# Patient Record
Sex: FEMALE | Race: WHITE | NOT HISPANIC OR LATINO | URBAN - METROPOLITAN AREA
[De-identification: names, ages, dates, MRNs, and addresses within clinical notes are randomized per-mention and may not be internally consistent; named-entity substitution may affect disease eponyms.]

---

## 2023-08-05 ENCOUNTER — EMERGENCY (EMERGENCY)
Facility: HOSPITAL | Age: 58
LOS: 1 days | Discharge: HOME | End: 2023-08-05
Attending: EMERGENCY MEDICINE
Payer: MEDICAID

## 2023-08-05 VITALS
TEMPERATURE: 98 F | RESPIRATION RATE: 18 BRPM | HEART RATE: 75 BPM | HEIGHT: 65.75 IN | WEIGHT: 174.17 LBS | SYSTOLIC BLOOD PRESSURE: 130 MMHG | OXYGEN SATURATION: 99 % | DIASTOLIC BLOOD PRESSURE: 89 MMHG

## 2023-08-05 VITALS
OXYGEN SATURATION: 99 % | RESPIRATION RATE: 18 BRPM | HEART RATE: 60 BPM | DIASTOLIC BLOOD PRESSURE: 84 MMHG | TEMPERATURE: 98 F | SYSTOLIC BLOOD PRESSURE: 132 MMHG

## 2023-08-05 PROCEDURE — 99283 EMERGENCY DEPT VISIT LOW MDM: CPT

## 2023-08-05 PROCEDURE — 99282 EMERGENCY DEPT VISIT SF MDM: CPT

## 2023-08-05 NOTE — ED ADULT NURSE NOTE - NSFALLUNIVINTERV_ED_ALL_ED
Bed/Stretcher in lowest position, wheels locked, appropriate side rails in place/Call bell, personal items and telephone in reach/Instruct patient to call for assistance before getting out of bed/chair/stretcher/Non-slip footwear applied when patient is off stretcher/[place] to call system/Physically safe environment - no spills, clutter or unnecessary equipment/Purposeful proactive rounding/Room/bathroom lighting operational, light cord in reach

## 2023-08-05 NOTE — ED ADULT NURSE NOTE - OBJECTIVE STATEMENT
Pt presents to ED with c/o clogged right ear and hearing ringing. Reports discomfort to left ear. Denies any hearing loss.

## 2023-08-05 NOTE — ED PROVIDER NOTE - OBJECTIVE STATEMENT
57-year-old woman, no past medical history, complains of about 5 days of ringing in her right ear and discomfort in both of her eyes.  She denies any pain in her ear and has not had any ear discharge or injury.  She denies any recent antibiotic use and has never had this problem before.  She denies dizziness/vertigo/headache/fever/chills/nausea/vomiting/focal weakness/numbness.  No vision change/blurriness.   She said that she had an upper respiratory infection recently and took some medications from [place] but unclear which medications.  [place]  #[id number].

## 2023-08-05 NOTE — ED PROVIDER NOTE - NSFOLLOWUPINSTRUCTIONS_ED_ALL_ED_FT
Log Out.  [employer] CareNotes®  :  [facility]        TINNITUS - General Information    Tinnitus    WHAT YOU NEED TO KNOW:    What is tinnitus? Tinnitus is when you hear ringing, clicking, buzzing, or hissing in one or both ears. You may also hear whistling, chirping, or pulsing. It may be soft or loud, and at a low or high pitch. Tinnitus that lasts longer than 6 months is considered chronic.    What causes or increases my risk for tinnitus? Tinnitus may be caused by problems with your hearing system, including the parts of your brain that sort out sounds. Tinnitus may also be caused by a health condition, such as Ménière disease. The following may increase your risk:    Age older than 60 years    Exposure to loud noise    Hearing loss or abnormal bone structure in the ear    Ear and sinus infections, or wax buildup    Hormone changes in women    Diseases of the heart and blood vessels, or brain tumors    Certain medicines, such as aspirin, NSAIDs, methotrexate, and erythromycin    Anxiety, sleep problems, or depression  How is tinnitus diagnosed? Your healthcare provider will ask about your symptoms and examine your ears, jaw, and neck. Tell your provider if you have tinnitus all the time or if it comes and goes. Your provider may ask if anything makes it worse, such as stress or anxiety. You may also need any of the following tests:    A hearing test may show problems with your ear. Your eardrum and middle ear may also be examined and tested.    An ultrasound, CT, MRI, or MRA may show the cause of your tinnitus. You may be given contrast liquid to help the parts of your ear show up better in the pictures. Tell the healthcare provider if you have ever had an allergic reaction to contrast liquid. Do not enter the MRI room with anything metal. Metal can cause serious injury. Tell the healthcare provider if you have any metal in or on your body.  How is tinnitus treated? You may not need treatment. Your symptoms may only appear when you are anxious or stressed. Your healthcare provider may stop certain medicines that may be causing your tinnitus. You may also need medicines to help decrease your symptoms. The following can help treat or manage tinnitus:    Counseling can help you learn ways to relax, decrease stress, and make your tinnitus less noticeable.    Cognitive behavioral therapy helps you understand your condition. Your therapist will help you learn to cope with tinnitus. You may also learn new ways to relax and retrain your behavior to decrease your symptoms.    Sound therapy, such as white noise machines, may help cover your tinnitus with a pleasant sound. Sound therapy devices can help you fall asleep or help you relax. These devices can be worn in your ear or placed next to your bed at night.    Hearing aids or cochlear implants may help if you have hearing loss.    Surgery may be needed if your tinnitus is caused by abnormal blood vessels or a mass.    Do not smoke. Nicotine decreases blood flow to your ear and can make your tinnitus worse. Do not use e-cigarettes or smokeless tobacco in place of cigarettes or to help you quit. They still contain nicotine. Ask your healthcare provider for information if you currently smoke and need help quitting.    Decrease how much alcohol and caffeine you drink. Alcohol and caffeine can make your tinnitus worse.  How can I help prevent tinnitus?    Avoid exposure to loud noise, such as loud music or power tools. Occasional exposure can still cause tinnitus. Move away from the noise or turn down the volume.    Wear ear protection when you are exposed to loud noises. Good ear protection includes ear plugs or headphones that reduce noise.  Call 911 if:    You feel like hurting yourself or others because of the constant noise.    When should I contact my healthcare provider?    You have headaches.    You are tired and have trouble concentrating or remembering things.    You have more anxiety or stress than usual.    You have deep sadness or depression.    You have trouble falling asleep or staying asleep.    Your symptoms do not go away or they get worse.    You have questions or concerns about your condition or care.  CARE AGREEMENT:    You have the right to help plan your care. Learn about your health condition and how it may be treated. Discuss treatment options with your healthcare providers to decide what care you want to receive. You always have the right to refuse treatment.    © [employer] 1973, 2023    	  back to top            © [employer] 1973, 2023

## 2023-08-05 NOTE — ED PROVIDER NOTE - ENMT, MLM
Airway patent, Nasal mucosa clear. Mouth with normal mucosa. Throat has no vesicles, no oropharyngeal exudates/erythema/swelling and uvula is midline.  TM's normal b/l.  External ears normal, no pain with movement.  No mastoid tenderness.

## 2023-08-05 NOTE — ED PROVIDER NOTE - PATIENT PORTAL LINK FT
You can access the FollowMyHealth Patient Portal offered by [facility] by registering at the following website: [email or web address]. By joining [employer]’s FollowMyHealth portal, you will also be able to view your health information using other applications (apps) compatible with our system.

## 2023-11-20 NOTE — ED ADULT NURSE NOTE - NSFALLLASTSIX_ED_ALL_ED
Action November 20, 2023 2:46 PM MT   Action Taken Sent a request for records and imaging from [facility].     Action November 21, 2023 8:38 AM MT   Action Taken Called [facility] HIM, tt: rep kurt Jimenez have the processing team fax over records STAT.      Action November 21, 2023 2:18 PM MT   Action Taken Most recent note received from [facility], we already have in the system, from July 2023.     DIAGNOSIS: Painful mass of RT Foot/Ankle   APPOINTMENT DATE: 11/21/2023   NOTES STATUS DETAILS   OFFICE NOTE from referring provider [facility] 07/27/2023 - Dr.Paul Barron - [facility]   OFFICE NOTE from other specialist Care Everywhere 03/29/2023 - Milka Figueroa MD - [facility]   MEDICATION LIST Care Everywhere    MRI PACS [facility]:  07/31/2023 - RT Ankle    [facility]:  04/07/2023 - RT Ankle   XRAYS (IMAGES & REPORTS) PACS [facility]:  07/27/2023 - RT Ankle       
No.